# Patient Record
Sex: FEMALE | Race: WHITE | NOT HISPANIC OR LATINO | Employment: UNEMPLOYED | URBAN - METROPOLITAN AREA
[De-identification: names, ages, dates, MRNs, and addresses within clinical notes are randomized per-mention and may not be internally consistent; named-entity substitution may affect disease eponyms.]

---

## 2023-11-07 RX ORDER — PRENATAL VIT 49/IRON FUM/FOLIC 6.75-0.2MG
TABLET ORAL
COMMUNITY

## 2023-11-07 RX ORDER — NORETHINDRONE ACETATE AND ETHINYL ESTRADIOL 1; 20 MG/1; UG/1
TABLET ORAL
COMMUNITY
Start: 2023-03-31

## 2024-08-21 ENCOUNTER — INITIAL PRENATAL (OUTPATIENT)
Dept: OBSTETRICS AND GYNECOLOGY | Facility: CLINIC | Age: 19
End: 2024-08-21
Payer: COMMERCIAL

## 2024-08-21 VITALS — BODY MASS INDEX: 22.44 KG/M2 | WEIGHT: 139 LBS

## 2024-08-21 DIAGNOSIS — Z34.80 SUPERVISION OF OTHER NORMAL PREGNANCY, ANTEPARTUM (HHS-HCC): Primary | ICD-10-CM

## 2024-08-21 DIAGNOSIS — Z3A.13 13 WEEKS GESTATION OF PREGNANCY (HHS-HCC): ICD-10-CM

## 2024-08-21 DIAGNOSIS — Z87.59 HISTORY OF GESTATIONAL HYPERTENSION: ICD-10-CM

## 2024-08-21 PROCEDURE — 86704 HEP B CORE ANTIBODY TOTAL: CPT

## 2024-08-21 PROCEDURE — 86317 IMMUNOASSAY INFECTIOUS AGENT: CPT

## 2024-08-21 PROCEDURE — 83036 HEMOGLOBIN GLYCOSYLATED A1C: CPT

## 2024-08-21 PROCEDURE — 83021 HEMOGLOBIN CHROMOTOGRAPHY: CPT

## 2024-08-21 PROCEDURE — 87491 CHLMYD TRACH DNA AMP PROBE: CPT

## 2024-08-21 PROCEDURE — 86803 HEPATITIS C AB TEST: CPT

## 2024-08-21 PROCEDURE — 86706 HEP B SURFACE ANTIBODY: CPT

## 2024-08-21 PROCEDURE — 87591 N.GONORRHOEAE DNA AMP PROB: CPT

## 2024-08-21 PROCEDURE — 86780 TREPONEMA PALLIDUM: CPT

## 2024-08-21 PROCEDURE — 87389 HIV-1 AG W/HIV-1&-2 AB AG IA: CPT

## 2024-08-21 RX ORDER — ONDANSETRON 4 MG/1
TABLET, FILM COATED ORAL
COMMUNITY
Start: 2024-06-20

## 2024-08-21 RX ORDER — CYCLOBENZAPRINE HCL 10 MG
TABLET ORAL
COMMUNITY
Start: 2024-07-25

## 2024-08-21 NOTE — PROGRESS NOTES
Problem List Items Addressed This Visit       Supervision of other normal pregnancy, antepartum ([facility]) - Primary    Relevant Orders    C. Trachomatis / N. Gonorrhoeae, Amplified Detection    Hemoglobin A1C    Hemoglobin Identification with Path Review    Hepatitis B Core Antibody, Total    Hepatitis B Surface Antibody    Hepatitis C Antibody    HIV 1/2 Antigen/Antibody Screen with Reflex to Confirmation    Rubella IgG    Syphilis Screen with Reflex    13 weeks gestation of pregnancy ([facility])    Overview     Desired provider in labor: [] CNM  [x] Physician  [] Blood Products: [x] Yes, accepts [] No, needs counseling  [x] Initial BMI: 22.45   [] Prenatal Labs:   [x] Cervical Cancer Screening up to date  [x] Rh status: AB+  [x] Genetic Screening:    [x] NT US: (11-13 wks)  [x] Baby ASA (if indicated):  [] Pregnancy dated by:     [] Anatomy US: (19-20 wks)  [] Federal Sterilization consent signed (if indicated):  [] 1hr GCT at 24-28wks:  [] Rhogam (if indicated):   [] Fetal Surveillance (if indicated):  [] Tdap (27-32 wks, may be given up to 36 wks if initial window missed):   [] RSV (32-36 wks) (Sept. to end of Jan):   [] Flu Vaccine:    [] Breastfeeding:  [] Postpartum Birth control method:   [] GBS at 36 - 37 wks:  [] 39 weeks discussion of IOL vs. Expectant management:  [] Mode of delivery ( anticipated ):           Relevant Orders    C. Trachomatis / N. Gonorrhoeae, Amplified Detection    Hemoglobin A1C    Hemoglobin Identification with Path Review    Hepatitis B Core Antibody, Total    Hepatitis B Surface Antibody    Hepatitis C Antibody    HIV 1/2 Antigen/Antibody Screen with Reflex to Confirmation    Rubella IgG    Syphilis Screen with Reflex    History of gestational hypertension

## 2024-08-22 ENCOUNTER — TELEPHONE (OUTPATIENT)
Dept: OBSTETRICS AND GYNECOLOGY | Facility: CLINIC | Age: 19
End: 2024-08-22
Payer: COMMERCIAL

## 2024-08-22 DIAGNOSIS — R76.8 HEPATITIS B ANTIBODY POSITIVE: Primary | ICD-10-CM

## 2024-08-22 PROBLEM — O98.419: Status: ACTIVE | Noted: 2024-08-22

## 2024-08-22 PROBLEM — B18.1: Status: ACTIVE | Noted: 2024-08-22

## 2024-08-22 LAB
C TRACH RRNA SPEC QL NAA+PROBE: NEGATIVE
EST. AVERAGE GLUCOSE BLD GHB EST-MCNC: 80 MG/DL
HBA1C MFR BLD: 4.4 %
HBV CORE AB SER QL: NONREACTIVE
HBV SURFACE AB SER-ACNC: 108.1 MIU/ML
HCV AB SER QL: NONREACTIVE
HEMOGLOBIN A2: 3.3 % (ref 2–3.5)
HEMOGLOBIN A: 96.3 % (ref 95.8–98)
HEMOGLOBIN F: 0.4 % (ref 0–2)
HEMOGLOBIN IDENTIFICATION INTERPRETATION: NORMAL
HIV 1+2 AB+HIV1 P24 AG SERPL QL IA: NONREACTIVE
N GONORRHOEA DNA SPEC QL PROBE+SIG AMP: NEGATIVE
PATH REVIEW-HGB IDENTIFICATION: NORMAL
RUBV IGG SERPL IA-ACNC: 3.3 IA
RUBV IGG SERPL QL IA: POSITIVE
TREPONEMA PALLIDUM IGG+IGM AB [PRESENCE] IN SERUM OR PLASMA BY IMMUNOASSAY: NONREACTIVE

## 2024-08-22 NOTE — TELEPHONE ENCOUNTER
PT calling about her Rubella results. She would like a better understanding of what they mean. RICHARD

## 2024-08-23 ENCOUNTER — TELEPHONE (OUTPATIENT)
Dept: OBSTETRICS AND GYNECOLOGY | Facility: CLINIC | Age: 19
End: 2024-08-23
Payer: COMMERCIAL

## 2024-08-23 NOTE — TELEPHONE ENCOUNTER
Pt called and wanted to speak to someone about her hep B antibodies and know if she has or have had them in the past. Or if she needs to get her family tested as well. Please advise thank you

## 2024-08-28 ENCOUNTER — APPOINTMENT (OUTPATIENT)
Dept: OBSTETRICS AND GYNECOLOGY | Facility: CLINIC | Age: 19
End: 2024-08-28
Payer: COMMERCIAL

## 2024-09-17 ENCOUNTER — ROUTINE PRENATAL (OUTPATIENT)
Dept: OBSTETRICS AND GYNECOLOGY | Facility: CLINIC | Age: 19
End: 2024-09-17
Payer: COMMERCIAL

## 2024-09-17 VITALS — WEIGHT: 143 LBS | SYSTOLIC BLOOD PRESSURE: 105 MMHG | DIASTOLIC BLOOD PRESSURE: 63 MMHG | BODY MASS INDEX: 23.08 KG/M2

## 2024-09-17 DIAGNOSIS — Z34.80 SUPERVISION OF OTHER NORMAL PREGNANCY, ANTEPARTUM (HHS-HCC): Primary | ICD-10-CM

## 2024-09-17 DIAGNOSIS — Z87.59 HISTORY OF GESTATIONAL HYPERTENSION: ICD-10-CM

## 2024-09-17 DIAGNOSIS — R76.8 HEPATITIS B ANTIBODY POSITIVE: ICD-10-CM

## 2024-09-17 PROBLEM — Z3A.17 17 WEEKS GESTATION OF PREGNANCY (HHS-HCC): Status: ACTIVE | Noted: 2024-08-21

## 2024-09-17 PROCEDURE — 99213 OFFICE O/P EST LOW 20 MIN: CPT | Performed by: OBSTETRICS & GYNECOLOGY

## 2024-09-17 ASSESSMENT — ENCOUNTER SYMPTOMS
SORE THROAT: 1
WHEEZING: 0
DYSURIA: 0
DIARRHEA: 0
VOMITING: 1
HEADACHES: 1
ABDOMINAL PAIN: 0
FEVER: 1
NAUSEA: 1
COUGH: 1

## 2024-09-17 NOTE — PROGRESS NOTES
Ob Follow-up  24     SUBJECTIVE      HPI: Luda Navas is a 19 y.o.  at 17w4d here for RPNV.  She has no contractions, no bleeding, or no LOF. Reports  normal fetal movement. Patient reports nausea and vomiting last 2 days. Using zofran at home. Able to keep down liquids       OBJECTIVE  Visit Vitals  /63   Wt 64.9 kg (143 lb)   BMI 23.08 kg/m²   OB Status Pregnant   Smoking Status Some Days   BSA 1.74 m²            ASSESSMENT & PLAN    Luda Navas is a 19 y.o.  at 17w4d here for the following concerns we addressed today:    Problem List Items Addressed This Visit       Supervision of other normal pregnancy, antepartum ([facility]) - Primary    History of gestational hypertension    Hepatitis B antibody positive    Overview     Refer to gastroenterology              No orders of the defined types were placed in this encounter.       RTC in 4 weeks      Ethan Hudson DO     Answers submitted by the patient for this visit:  Fever Questionnaire (Submitted on 2024)  Chief Complaint: Fever  Chronicity: new  Onset: today  contaminated food: No  contaminated water: No  history of cancer: Yes  occupational exposure: No  recent travel: No  immunosuppression: No  recent illness: No  sick contacts: No  Frequency: rarely  Progression since onset: unchanged  Max temp prior to arrival: unmeasured  abdominal pain: No  chest pain: Yes  congestion: Yes  cough: Yes  diarrhea: No  ear pain: Yes  headaches: Yes  muscle aches: Yes  nausea: Yes  rash: No  sleepiness: Yes  sore throat: Yes  urinary pain: No  vomiting: Yes  wheezing: No

## 2024-09-18 ENCOUNTER — APPOINTMENT (OUTPATIENT)
Dept: OBSTETRICS AND GYNECOLOGY | Facility: CLINIC | Age: 19
End: 2024-09-18
Payer: COMMERCIAL

## 2024-09-18 PROBLEM — U07.1 COVID-19 AFFECTING PREGNANCY, ANTEPARTUM (HHS-HCC): Status: ACTIVE | Noted: 2024-09-18

## 2024-09-18 PROBLEM — O98.519 COVID-19 AFFECTING PREGNANCY, ANTEPARTUM (HHS-HCC): Status: ACTIVE | Noted: 2024-09-18

## 2024-10-01 ENCOUNTER — HOSPITAL ENCOUNTER (OUTPATIENT)
Dept: RADIOLOGY | Facility: CLINIC | Age: 19
Discharge: HOME | End: 2024-10-01
Payer: COMMERCIAL

## 2024-10-01 DIAGNOSIS — R76.8 HEPATITIS B ANTIBODY POSITIVE: ICD-10-CM

## 2024-10-01 DIAGNOSIS — O35.9XX0 MATERNAL CARE FOR (SUSPECTED) FETAL ABNORMALITY AND DAMAGE, UNSPECIFIED, NOT APPLICABLE OR UNSPECIFIED: ICD-10-CM

## 2024-10-01 DIAGNOSIS — Z34.80 SUPERVISION OF OTHER NORMAL PREGNANCY, ANTEPARTUM (HHS-HCC): ICD-10-CM

## 2024-10-01 DIAGNOSIS — Z87.59 HISTORY OF GESTATIONAL HYPERTENSION: ICD-10-CM

## 2024-10-01 PROCEDURE — 76805 OB US >/= 14 WKS SNGL FETUS: CPT

## 2024-10-01 PROCEDURE — 76805 OB US >/= 14 WKS SNGL FETUS: CPT | Performed by: MEDICAL GENETICS

## 2024-10-16 ENCOUNTER — APPOINTMENT (OUTPATIENT)
Dept: OBSTETRICS AND GYNECOLOGY | Facility: CLINIC | Age: 19
End: 2024-10-16
Payer: COMMERCIAL

## 2024-10-16 ENCOUNTER — ROUTINE PRENATAL (OUTPATIENT)
Dept: OBSTETRICS AND GYNECOLOGY | Facility: CLINIC | Age: 19
End: 2024-10-16
Payer: COMMERCIAL

## 2024-10-16 VITALS — DIASTOLIC BLOOD PRESSURE: 60 MMHG | SYSTOLIC BLOOD PRESSURE: 110 MMHG | WEIGHT: 151 LBS | BODY MASS INDEX: 24.37 KG/M2

## 2024-10-16 DIAGNOSIS — Z3A.21 21 WEEKS GESTATION OF PREGNANCY (HHS-HCC): ICD-10-CM

## 2024-10-16 DIAGNOSIS — Z34.80 SUPERVISION OF OTHER NORMAL PREGNANCY, ANTEPARTUM (HHS-HCC): Primary | ICD-10-CM

## 2024-10-16 PROCEDURE — 99213 OFFICE O/P EST LOW 20 MIN: CPT | Performed by: OBSTETRICS & GYNECOLOGY

## 2024-10-16 NOTE — PROGRESS NOTES
Ob Visit  10/16/24     SUBJECTIVE      HPI: Luda Navas is a 19 y.o.  at 21w5d here for RPNV.  She has no contractions, no bleeding, or no LOF. Reports normal fetal movement. Patient reports no c/o       OBJECTIVE  Visit Vitals  /60   Wt 68.5 kg (151 lb)   BMI 24.37 kg/m²   OB Status Pregnant   Smoking Status Some Days   BSA 1.79 m²            ASSESSMENT & PLAN    Luda Navas is a 19 y.o.  at 21w5d here for the following concerns we addressed today:    Problem List Items Addressed This Visit       Supervision of other normal pregnancy, antepartum ([facility]) - Primary    21 weeks gestation of pregnancy ([facility])    Overview     Desired provider in labor: [] CNM  [x] Physician  [] Blood Products: [x] Yes, accepts [] No, needs counseling  [x] Initial BMI: 22.45   [] Prenatal Labs:   [x] Cervical Cancer Screening up to date  [x] Rh status: AB+  [x] Genetic Screening:    [x] NT US: (11-13 wks)  [x] Baby ASA (if indicated):  [x] Pregnancy dated by: ultrasound    [x] Anatomy US: (19-20 wks)  [] Federal Sterilization consent signed (if indicated):  [] 1hr GCT at 24-28wks:  [] Rhogam (if indicated):   [] Fetal Surveillance (if indicated):  [] Tdap (27-32 wks, may be given up to 36 wks if initial window missed):   [] RSV (32-36 wks) (Sept. to end ):   [] Flu Vaccine:    [] Breastfeeding:  [] Postpartum Birth control method:   [] GBS at 36 - 37 wks:  [] 39 weeks discussion of IOL vs. Expectant management:  [] Mode of delivery ( anticipated ):                RTC in 4 weeks      Ethan Hudson DO

## 2024-10-23 ENCOUNTER — APPOINTMENT (OUTPATIENT)
Dept: OBSTETRICS AND GYNECOLOGY | Facility: CLINIC | Age: 19
End: 2024-10-23
Payer: COMMERCIAL

## 2024-11-13 ENCOUNTER — APPOINTMENT (OUTPATIENT)
Dept: OBSTETRICS AND GYNECOLOGY | Facility: CLINIC | Age: 19
End: 2024-11-13
Payer: COMMERCIAL

## 2024-11-13 VITALS — WEIGHT: 161 LBS | SYSTOLIC BLOOD PRESSURE: 124 MMHG | DIASTOLIC BLOOD PRESSURE: 60 MMHG | BODY MASS INDEX: 25.99 KG/M2

## 2024-11-13 DIAGNOSIS — O98.519 COVID-19 AFFECTING PREGNANCY, ANTEPARTUM (HHS-HCC): ICD-10-CM

## 2024-11-13 DIAGNOSIS — U07.1 COVID-19 AFFECTING PREGNANCY, ANTEPARTUM (HHS-HCC): ICD-10-CM

## 2024-11-13 DIAGNOSIS — R76.8 HEPATITIS B ANTIBODY POSITIVE: ICD-10-CM

## 2024-11-13 DIAGNOSIS — Z34.80 SUPERVISION OF OTHER NORMAL PREGNANCY, ANTEPARTUM (HHS-HCC): Primary | ICD-10-CM

## 2024-11-13 DIAGNOSIS — Z87.59 HISTORY OF GESTATIONAL HYPERTENSION: ICD-10-CM

## 2024-11-13 DIAGNOSIS — Z3A.25 25 WEEKS GESTATION OF PREGNANCY (HHS-HCC): ICD-10-CM

## 2024-11-13 NOTE — PROGRESS NOTES
Ob Visit  24     SUBJECTIVE      HPI: Luda Navas is a 19 y.o.  at 25w5d here for RPNV.  She has no contractions, no bleeding, or no LOF. Reports normal fetal movement. Patient reports no c/o       OBJECTIVE  Visit Vitals  /60   Wt 73 kg (161 lb)   BMI 25.99 kg/m²   OB Status Pregnant   Smoking Status Some Days   BSA 1.84 m²            ASSESSMENT & PLAN    Luda Navas is a 19 y.o.  at 25w5d here for the following concerns we addressed today:    Problem List Items Addressed This Visit       Supervision of other normal pregnancy, antepartum ([facility]) - Primary    25 weeks gestation of pregnancy ([facility])    Overview     Desired provider in labor: [] CNM  [x] Physician  [] Blood Products: [x] Yes, accepts [] No, needs counseling  [x] Initial BMI: 22.45   [] Prenatal Labs:   [x] Cervical Cancer Screening up to date  [x] Rh status: AB+  [x] Genetic Screening:    [x] NT US: (11-13 wks)  [x] Baby ASA (if indicated):  [x] Pregnancy dated by: ultrasound    [x] Anatomy US: (19-20 wks)  [] Federal Sterilization consent signed (if indicated):  [] 1hr GCT at 24-28wks:  [] Rhogam (if indicated):   [] Fetal Surveillance (if indicated):  [] Tdap (27-32 wks, may be given up to 36 wks if initial window missed):   [] RSV (32-36 wks) (Sept. to end ):   [] Flu Vaccine:    [] Breastfeeding:  [] Postpartum Birth control method:   [] GBS at 36 - 37 wks:  [] 39 weeks discussion of IOL vs. Expectant management:  [] Mode of delivery ( anticipated ):           History of gestational hypertension    Hepatitis B antibody positive    Overview     Refer to gastroenterology         [facility]    Overview     Dx on   Given paxlovid  Growth ultrasound 30 and 36 weeks              RTC in 3 weeks      Ethan Hudson, DO

## 2024-12-03 ENCOUNTER — ROUTINE PRENATAL (OUTPATIENT)
Dept: OBSTETRICS AND GYNECOLOGY | Facility: CLINIC | Age: 19
End: 2024-12-03
Payer: COMMERCIAL

## 2024-12-03 ENCOUNTER — LAB (OUTPATIENT)
Dept: LAB | Facility: LAB | Age: 19
End: 2024-12-03
Payer: COMMERCIAL

## 2024-12-03 VITALS — DIASTOLIC BLOOD PRESSURE: 82 MMHG | WEIGHT: 169 LBS | BODY MASS INDEX: 27.28 KG/M2 | SYSTOLIC BLOOD PRESSURE: 120 MMHG

## 2024-12-03 DIAGNOSIS — Z87.59 HISTORY OF GESTATIONAL HYPERTENSION: ICD-10-CM

## 2024-12-03 DIAGNOSIS — Z34.80 SUPERVISION OF OTHER NORMAL PREGNANCY, ANTEPARTUM (HHS-HCC): ICD-10-CM

## 2024-12-03 DIAGNOSIS — R76.8 HEPATITIS B ANTIBODY POSITIVE: ICD-10-CM

## 2024-12-03 DIAGNOSIS — O98.519 COVID-19 AFFECTING PREGNANCY, ANTEPARTUM (HHS-HCC): ICD-10-CM

## 2024-12-03 DIAGNOSIS — Z34.80 SUPERVISION OF OTHER NORMAL PREGNANCY, ANTEPARTUM (HHS-HCC): Primary | ICD-10-CM

## 2024-12-03 DIAGNOSIS — U07.1 COVID-19 AFFECTING PREGNANCY, ANTEPARTUM (HHS-HCC): ICD-10-CM

## 2024-12-03 PROBLEM — Z3A.28 28 WEEKS GESTATION OF PREGNANCY (HHS-HCC): Status: ACTIVE | Noted: 2024-08-21

## 2024-12-03 LAB
ERYTHROCYTE [DISTWIDTH] IN BLOOD BY AUTOMATED COUNT: 12.6 % (ref 11.5–14.5)
GLUCOSE 1H P 50 G GLC PO SERPL-MCNC: 91 MG/DL
HCT VFR BLD AUTO: 33.4 % (ref 36–46)
HGB BLD-MCNC: 11.3 G/DL (ref 12–16)
MCH RBC QN AUTO: 32.2 PG (ref 26–34)
MCHC RBC AUTO-ENTMCNC: 33.8 G/DL (ref 32–36)
MCV RBC AUTO: 95 FL (ref 80–100)
NRBC BLD-RTO: 0 /100 WBCS (ref 0–0)
PLATELET # BLD AUTO: 289 X10*3/UL (ref 150–450)
RBC # BLD AUTO: 3.51 X10*6/UL (ref 4–5.2)
REFLEX ADDED, ANEMIA PANEL: NORMAL
TREPONEMA PALLIDUM IGG+IGM AB [PRESENCE] IN SERUM OR PLASMA BY IMMUNOASSAY: NONREACTIVE
WBC # BLD AUTO: 15.7 X10*3/UL (ref 4.4–11.3)

## 2024-12-03 PROCEDURE — 85027 COMPLETE CBC AUTOMATED: CPT

## 2024-12-03 PROCEDURE — 82947 ASSAY GLUCOSE BLOOD QUANT: CPT

## 2024-12-03 PROCEDURE — 86780 TREPONEMA PALLIDUM: CPT

## 2024-12-03 PROCEDURE — 36415 COLL VENOUS BLD VENIPUNCTURE: CPT

## 2024-12-03 PROCEDURE — 99213 OFFICE O/P EST LOW 20 MIN: CPT | Performed by: OBSTETRICS & GYNECOLOGY

## 2024-12-03 NOTE — PROGRESS NOTES
Ob Visit  24     SUBJECTIVE      HPI: Luda Navas is a 19 y.o.  at 28w4d here for RPNV.  She has no contractions, no bleeding, or no LOF. Reports normal fetal movement. Patient reports no c/o       OBJECTIVE  Visit Vitals  /82   Wt 76.7 kg (169 lb)   BMI 27.28 kg/m²   OB Status Pregnant   Smoking Status Some Days   BSA 1.89 m²            ASSESSMENT & PLAN    Luda Navas is a 19 y.o.  at 28w4d here for the following concerns we addressed today:    Problem List Items Addressed This Visit       Supervision of other normal pregnancy, antepartum ([facility]) - Primary    Relevant Orders    CBC Anemia Panel With Reflex, Pregnancy    Syphilis Screen with Reflex    Glucose, 1 Hour Screen, Pregnancy    History of gestational hypertension    Hepatitis B antibody positive    Overview     Refer to gastroenterology         COVID-19 affecting pregnancy, antepartum ([facility])    Overview     Dx on   Given paxlovid  Growth ultrasound 30 and 36 weeks              RTC in 2 weeks      Ethan Hudson, DO

## 2024-12-04 ENCOUNTER — APPOINTMENT (OUTPATIENT)
Dept: OBSTETRICS AND GYNECOLOGY | Facility: CLINIC | Age: 19
End: 2024-12-04
Payer: COMMERCIAL

## 2024-12-05 ENCOUNTER — TELEPHONE (OUTPATIENT)
Dept: OBSTETRICS AND GYNECOLOGY | Facility: CLINIC | Age: 19
End: 2024-12-05
Payer: COMMERCIAL

## 2024-12-05 NOTE — TELEPHONE ENCOUNTER
Pt would like results of her blood work she had done. Please advise thank you.  Evelyn Hernandez MA

## 2024-12-06 ENCOUNTER — TELEPHONE (OUTPATIENT)
Dept: OBSTETRICS AND GYNECOLOGY | Facility: CLINIC | Age: 19
End: 2024-12-06
Payer: COMMERCIAL

## 2024-12-18 ENCOUNTER — APPOINTMENT (OUTPATIENT)
Dept: OBSTETRICS AND GYNECOLOGY | Facility: CLINIC | Age: 19
End: 2024-12-18
Payer: COMMERCIAL

## 2024-12-18 VITALS — SYSTOLIC BLOOD PRESSURE: 116 MMHG | DIASTOLIC BLOOD PRESSURE: 62 MMHG | BODY MASS INDEX: 27.54 KG/M2 | WEIGHT: 170.6 LBS

## 2024-12-18 DIAGNOSIS — U07.1 COVID-19 AFFECTING PREGNANCY, ANTEPARTUM (HHS-HCC): ICD-10-CM

## 2024-12-18 DIAGNOSIS — Z3A.30 30 WEEKS GESTATION OF PREGNANCY (HHS-HCC): ICD-10-CM

## 2024-12-18 DIAGNOSIS — Z34.80 SUPERVISION OF OTHER NORMAL PREGNANCY, ANTEPARTUM (HHS-HCC): Primary | ICD-10-CM

## 2024-12-18 DIAGNOSIS — Z87.59 HISTORY OF GESTATIONAL HYPERTENSION: ICD-10-CM

## 2024-12-18 DIAGNOSIS — R76.8 HEPATITIS B ANTIBODY POSITIVE: ICD-10-CM

## 2024-12-18 DIAGNOSIS — O98.519 COVID-19 AFFECTING PREGNANCY, ANTEPARTUM (HHS-HCC): ICD-10-CM

## 2024-12-18 PROCEDURE — 99213 OFFICE O/P EST LOW 20 MIN: CPT | Performed by: OBSTETRICS & GYNECOLOGY

## 2024-12-18 NOTE — PROGRESS NOTES
Ob Visit  24     SUBJECTIVE      HPI: Luda Navas is a 19 y.o.  at 30w5d here for RPNV.  She has no contractions, no bleeding, or  LOF. Reports normal fetal movement. Patient reports no c/o       OBJECTIVE  Visit Vitals  /62   Wt 77.4 kg (170 lb 9.6 oz)   BMI 27.54 kg/m²   OB Status Pregnant   Smoking Status Some Days   BSA 1.9 m²            ASSESSMENT & PLAN    Luda Navas is a 19 y.o.  at 30w5d here for the following concerns we addressed today:    Problem List Items Addressed This Visit       Supervision of other normal pregnancy, antepartum ([facility]) - Primary    30 weeks gestation of pregnancy ([facility])    Overview     Desired provider in labor: [] CNM  [x] Physician  [] Blood Products: [x] Yes, accepts [] No, needs counseling  [x] Initial BMI: 22.45   [] Prenatal Labs:   [x] Cervical Cancer Screening up to date  [x] Rh status: AB+  [x] Genetic Screening:    [x] NT US: (11-13 wks)  [x] Baby ASA (if indicated):  [x] Pregnancy dated by: ultrasound    [x] Anatomy US: (19-20 wks)  [] Federal Sterilization consent signed (if indicated):  [x] 1hr GCT at 24-28wks:  [x] Rhogam (if indicated): n/a  [] Fetal Surveillance (if indicated):  [] Tdap (27-32 wks, may be given up to 36 wks if initial window missed):   [] RSV (32-36 wks) (Sept. to end of ):   [] Flu Vaccine:    [] Breastfeeding:  [] Postpartum Birth control method:   [] GBS at 36 - 37 wks:  [] 39 weeks discussion of IOL vs. Expectant management:  [] Mode of delivery ( anticipated ):           History of gestational hypertension    Hepatitis B antibody positive    Overview     Refer to gastroenterology         COVID-19 affecting pregnancy, antepartum ([facility])    Overview     Dx on   Given paxlovid  Growth ultrasound 30 and 36 weeks              RTC in 2 weeks      Ethan Hudson DO

## 2024-12-19 ENCOUNTER — TELEPHONE (OUTPATIENT)
Dept: OBSTETRICS AND GYNECOLOGY | Facility: CLINIC | Age: 19
End: 2024-12-19
Payer: COMMERCIAL

## 2024-12-19 DIAGNOSIS — O21.9 NAUSEA AND VOMITING DURING PREGNANCY: Primary | ICD-10-CM

## 2024-12-19 RX ORDER — ONDANSETRON 4 MG/1
8 TABLET, FILM COATED ORAL EVERY 12 HOURS PRN
Qty: 20 TABLET | Refills: 0 | Status: SHIPPED | OUTPATIENT
Start: 2024-12-19

## 2024-12-19 NOTE — TELEPHONE ENCOUNTER
Pt calling to request RX for back pain and  nausea - please send into [facility] on file    Yvonne Gonzalez

## 2025-01-02 ENCOUNTER — APPOINTMENT (OUTPATIENT)
Dept: OBSTETRICS AND GYNECOLOGY | Facility: CLINIC | Age: 20
End: 2025-01-02
Payer: COMMERCIAL

## 2025-01-02 ENCOUNTER — APPOINTMENT (OUTPATIENT)
Dept: GASTROENTEROLOGY | Facility: CLINIC | Age: 20
End: 2025-01-02
Payer: COMMERCIAL

## 2025-01-02 VITALS — WEIGHT: 174 LBS | SYSTOLIC BLOOD PRESSURE: 125 MMHG | BODY MASS INDEX: 28.08 KG/M2 | DIASTOLIC BLOOD PRESSURE: 74 MMHG

## 2025-01-02 DIAGNOSIS — K08.89 TOOTH PAIN: ICD-10-CM

## 2025-01-02 DIAGNOSIS — O98.519 COVID-19 AFFECTING PREGNANCY, ANTEPARTUM (HHS-HCC): ICD-10-CM

## 2025-01-02 DIAGNOSIS — Z34.80 SUPERVISION OF OTHER NORMAL PREGNANCY, ANTEPARTUM (HHS-HCC): Primary | ICD-10-CM

## 2025-01-02 DIAGNOSIS — Z3A.32 32 WEEKS GESTATION OF PREGNANCY (HHS-HCC): ICD-10-CM

## 2025-01-02 DIAGNOSIS — U07.1 COVID-19 AFFECTING PREGNANCY, ANTEPARTUM (HHS-HCC): ICD-10-CM

## 2025-01-02 DIAGNOSIS — Z87.59 HISTORY OF GESTATIONAL HYPERTENSION: ICD-10-CM

## 2025-01-02 PROCEDURE — 99213 OFFICE O/P EST LOW 20 MIN: CPT | Performed by: OBSTETRICS & GYNECOLOGY

## 2025-01-02 RX ORDER — AMOXICILLIN 500 MG/1
500 TABLET, FILM COATED ORAL 2 TIMES DAILY
Qty: 20 TABLET | Refills: 0 | Status: SHIPPED | OUTPATIENT
Start: 2025-01-02 | End: 2025-01-12

## 2025-01-02 NOTE — PROGRESS NOTES
Ob Visit  25     SUBJECTIVE      HPI: Luda Navas is a 19 y.o.  at 32w6d here for RPNV.  She has no contractions, no bleeding, or no LOF. Reports normal fetal movement. Patient reports no c/o       OBJECTIVE  Visit Vitals  /74   Wt 78.9 kg (174 lb)   BMI 28.08 kg/m²   OB Status Pregnant   Smoking Status Some Days   BSA 1.92 m²            ASSESSMENT & PLAN    Luda Navas is a 19 y.o.  at 32w6d here for the following concerns we addressed today:    Problem List Items Addressed This Visit       Supervision of other normal pregnancy, antepartum ([facility]) - Primary    32 weeks gestation of pregnancy ([facility])    Overview     Desired provider in labor: [] CNM  [x] Physician  [] Blood Products: [x] Yes, accepts [] No, needs counseling  [x] Initial BMI: 22.45   [x] Prenatal Labs:   [x] Cervical Cancer Screening up to date  [x] Rh status: AB+  [x] Genetic Screening:    [x] NT US: (11-13 wks)  [x] Baby ASA (if indicated):  [x] Pregnancy dated by: ultrasound    [x] Anatomy US: (19-20 wks)  [] Federal Sterilization consent signed (if indicated):  [x] 1hr GCT at 24-28wks:  [x] Rhogam (if indicated): n/a  [] Fetal Surveillance (if indicated):  [x] Tdap (27-32 wks, may be given up to 36 wks if initial window missed): 1/2  [] RSV (32-36 wks) (Sept. to end of ):   [x] Flu Vaccine:1/    [] Breastfeeding:  [] Postpartum Birth control method:   [] GBS at 36 - 37 wks:  [] 39 weeks discussion of IOL vs. Expectant management:  [] Mode of delivery ( anticipated ):           History of gestational hypertension    COVID-19 affecting pregnancy, antepartum ([facility])    Overview     Dx on   Given paxlovid  Growth ultrasound 30 and 36 weeks         Relevant Orders    US MAC OB imaging order         RTC in 2 weeks      Ethan Hudson,

## 2025-01-07 ENCOUNTER — HOSPITAL ENCOUNTER (OUTPATIENT)
Dept: RADIOLOGY | Facility: CLINIC | Age: 20
Discharge: HOME | End: 2025-01-07
Payer: COMMERCIAL

## 2025-01-07 DIAGNOSIS — Z87.59 HISTORY OF GESTATIONAL HYPERTENSION: ICD-10-CM

## 2025-01-07 DIAGNOSIS — Z34.80 SUPERVISION OF OTHER NORMAL PREGNANCY, ANTEPARTUM (HHS-HCC): ICD-10-CM

## 2025-01-07 DIAGNOSIS — R76.8 HEPATITIS B ANTIBODY POSITIVE: ICD-10-CM

## 2025-01-07 DIAGNOSIS — O36.5930 MATERNAL CARE FOR OTHER KNOWN OR SUSPECTED POOR FETAL GROWTH, THIRD TRIMESTER, NOT APPLICABLE OR UNSPECIFIED: ICD-10-CM

## 2025-01-07 PROCEDURE — 76816 OB US FOLLOW-UP PER FETUS: CPT

## 2025-01-07 PROCEDURE — 76816 OB US FOLLOW-UP PER FETUS: CPT | Performed by: OBSTETRICS & GYNECOLOGY

## 2025-01-08 ENCOUNTER — TELEPHONE (OUTPATIENT)
Dept: OBSTETRICS AND GYNECOLOGY | Facility: CLINIC | Age: 20
End: 2025-01-08
Payer: COMMERCIAL

## 2025-01-08 NOTE — TELEPHONE ENCOUNTER
Pt called and would like to go over ultrasound results. Please advise thank you.  Evelyn Hernandez MA

## 2025-01-12 ENCOUNTER — HOSPITAL ENCOUNTER (OUTPATIENT)
Facility: HOSPITAL | Age: 20
Discharge: HOME | End: 2025-01-12
Attending: OBSTETRICS & GYNECOLOGY | Admitting: OBSTETRICS & GYNECOLOGY
Payer: COMMERCIAL

## 2025-01-12 ENCOUNTER — HOSPITAL ENCOUNTER (OUTPATIENT)
Facility: HOSPITAL | Age: 20
End: 2025-01-12
Attending: OBSTETRICS & GYNECOLOGY | Admitting: OBSTETRICS & GYNECOLOGY
Payer: COMMERCIAL

## 2025-01-12 VITALS
TEMPERATURE: 98.2 F | BODY MASS INDEX: 27.95 KG/M2 | SYSTOLIC BLOOD PRESSURE: 106 MMHG | OXYGEN SATURATION: 98 % | RESPIRATION RATE: 16 BRPM | WEIGHT: 173.17 LBS | DIASTOLIC BLOOD PRESSURE: 54 MMHG | HEART RATE: 86 BPM

## 2025-01-12 DIAGNOSIS — Z36.89 NST (NON-STRESS TEST) REACTIVE (HHS-HCC): ICD-10-CM

## 2025-01-12 DIAGNOSIS — O99.891: ICD-10-CM

## 2025-01-12 DIAGNOSIS — Z34.83 MULTIGRAVIDA IN THIRD TRIMESTER (HHS-HCC): ICD-10-CM

## 2025-01-12 DIAGNOSIS — N30.00 ACUTE CYSTITIS WITHOUT HEMATURIA: ICD-10-CM

## 2025-01-12 DIAGNOSIS — O23.599 BACTERIAL VAGINOSIS IN PREGNANCY (HHS-HCC): Primary | ICD-10-CM

## 2025-01-12 DIAGNOSIS — R82.71: ICD-10-CM

## 2025-01-12 DIAGNOSIS — Z3A.34 34 WEEKS GESTATION OF PREGNANCY (HHS-HCC): ICD-10-CM

## 2025-01-12 DIAGNOSIS — B96.89 BACTERIAL VAGINOSIS IN PREGNANCY (HHS-HCC): Primary | ICD-10-CM

## 2025-01-12 LAB
APPEARANCE UR: CLEAR
BACTERIA #/AREA URNS AUTO: ABNORMAL /HPF
BILIRUB UR STRIP.AUTO-MCNC: NEGATIVE MG/DL
COLOR UR: ABNORMAL
GLUCOSE UR STRIP.AUTO-MCNC: ABNORMAL MG/DL
KETONES UR STRIP.AUTO-MCNC: ABNORMAL MG/DL
LEUKOCYTE ESTERASE UR QL STRIP.AUTO: ABNORMAL
MUCOUS THREADS #/AREA URNS AUTO: ABNORMAL /LPF
NITRITE UR QL STRIP.AUTO: NEGATIVE
PH UR STRIP.AUTO: 6 [PH]
PROT UR STRIP.AUTO-MCNC: ABNORMAL MG/DL
RBC # UR STRIP.AUTO: NEGATIVE /UL
RBC #/AREA URNS AUTO: ABNORMAL /HPF
SP GR UR STRIP.AUTO: 1.03
SQUAMOUS #/AREA URNS AUTO: ABNORMAL /HPF
UROBILINOGEN UR STRIP.AUTO-MCNC: NORMAL MG/DL
WBC #/AREA URNS AUTO: ABNORMAL /HPF

## 2025-01-12 PROCEDURE — 81001 URINALYSIS AUTO W/SCOPE: CPT | Performed by: MIDWIFE

## 2025-01-12 PROCEDURE — 99213 OFFICE O/P EST LOW 20 MIN: CPT | Performed by: MIDWIFE

## 2025-01-12 PROCEDURE — 59025 FETAL NON-STRESS TEST: CPT | Performed by: MIDWIFE

## 2025-01-12 PROCEDURE — 87086 URINE CULTURE/COLONY COUNT: CPT | Mod: AHULAB | Performed by: MIDWIFE

## 2025-01-12 PROCEDURE — 2500000002 HC RX 250 W HCPCS SELF ADMINISTERED DRUGS (ALT 637 FOR MEDICARE OP, ALT 636 FOR OP/ED): Performed by: MIDWIFE

## 2025-01-12 PROCEDURE — 99212 OFFICE O/P EST SF 10 MIN: CPT

## 2025-01-12 RX ORDER — NITROFURANTOIN 25; 75 MG/1; MG/1
100 CAPSULE ORAL EVERY 12 HOURS SCHEDULED
Status: DISCONTINUED | OUTPATIENT
Start: 2025-01-12 | End: 2025-01-12 | Stop reason: HOSPADM

## 2025-01-12 RX ORDER — METRONIDAZOLE 500 MG/1
500 TABLET ORAL 2 TIMES DAILY
Qty: 14 TABLET | Refills: 0 | Status: SHIPPED | OUTPATIENT
Start: 2025-01-12 | End: 2025-01-19

## 2025-01-12 RX ORDER — NITROFURANTOIN 25; 75 MG/1; MG/1
100 CAPSULE ORAL 2 TIMES DAILY
Qty: 9 CAPSULE | Refills: 0 | Status: SHIPPED | OUTPATIENT
Start: 2025-01-12 | End: 2025-01-17

## 2025-01-12 RX ADMIN — NITROFURANTOIN MONOHYDRATE/MACROCRYSTALS 100 MG: 25; 75 CAPSULE ORAL at 21:35

## 2025-01-12 SDOH — SOCIAL STABILITY: SOCIAL INSECURITY: PHYSICAL ABUSE: DENIES

## 2025-01-12 SDOH — SOCIAL STABILITY: SOCIAL INSECURITY: HAS ANYONE EVER THREATENED TO HURT YOUR FAMILY OR YOUR PETS?: NO

## 2025-01-12 SDOH — SOCIAL STABILITY: SOCIAL INSECURITY: HAVE YOU HAD THOUGHTS OF HARMING ANYONE ELSE?: NO

## 2025-01-12 SDOH — HEALTH STABILITY: MENTAL HEALTH: NON-SPECIFIC ACTIVE SUICIDAL THOUGHTS (PAST 1 MONTH): NO

## 2025-01-12 SDOH — SOCIAL STABILITY: SOCIAL INSECURITY: ARE YOU OR HAVE YOU BEEN THREATENED OR ABUSED PHYSICALLY, EMOTIONALLY, OR SEXUALLY BY ANYONE?: NO

## 2025-01-12 SDOH — HEALTH STABILITY: MENTAL HEALTH: WERE YOU ABLE TO COMPLETE ALL THE BEHAVIORAL HEALTH SCREENINGS?: YES

## 2025-01-12 SDOH — SOCIAL STABILITY: SOCIAL INSECURITY: ABUSE SCREEN: ADULT

## 2025-01-12 SDOH — HEALTH STABILITY: MENTAL HEALTH: HAVE YOU USED ANY SUBSTANCES (CANABIS, COCAINE, HEROIN, HALLUCINOGENS, INHALANTS, ETC.) IN THE PAST 12 MONTHS?: NO

## 2025-01-12 SDOH — SOCIAL STABILITY: SOCIAL INSECURITY: DOES ANYONE TRY TO KEEP YOU FROM HAVING/CONTACTING OTHER FRIENDS OR DOING THINGS OUTSIDE YOUR HOME?: NO

## 2025-01-12 SDOH — HEALTH STABILITY: MENTAL HEALTH: SUICIDAL BEHAVIOR (LIFETIME): NO

## 2025-01-12 SDOH — SOCIAL STABILITY: SOCIAL INSECURITY: DO YOU FEEL ANYONE HAS EXPLOITED OR TAKEN ADVANTAGE OF YOU FINANCIALLY OR OF YOUR PERSONAL PROPERTY?: NO

## 2025-01-12 SDOH — ECONOMIC STABILITY: HOUSING INSECURITY: DO YOU FEEL UNSAFE GOING BACK TO THE PLACE WHERE YOU ARE LIVING?: NO

## 2025-01-12 SDOH — HEALTH STABILITY: MENTAL HEALTH: HAVE YOU USED ANY PRESCRIPTION DRUGS OTHER THAN PRESCRIBED IN THE PAST 12 MONTHS?: NO

## 2025-01-12 SDOH — SOCIAL STABILITY: SOCIAL INSECURITY: VERBAL ABUSE: DENIES

## 2025-01-12 SDOH — SOCIAL STABILITY: SOCIAL INSECURITY: HAVE YOU HAD ANY THOUGHTS OF HARMING ANYONE ELSE?: NO

## 2025-01-12 SDOH — HEALTH STABILITY: MENTAL HEALTH: WISH TO BE DEAD (PAST 1 MONTH): NO

## 2025-01-12 ASSESSMENT — LIFESTYLE VARIABLES
AUDIT-C TOTAL SCORE: 0
AUDIT-C TOTAL SCORE: 0
SKIP TO QUESTIONS 9-10: 1
HOW OFTEN DO YOU HAVE 6 OR MORE DRINKS ON ONE OCCASION: NEVER
HOW MANY STANDARD DRINKS CONTAINING ALCOHOL DO YOU HAVE ON A TYPICAL DAY: PATIENT DOES NOT DRINK
HOW OFTEN DO YOU HAVE A DRINK CONTAINING ALCOHOL: NEVER

## 2025-01-12 ASSESSMENT — PATIENT HEALTH QUESTIONNAIRE - PHQ9
1. LITTLE INTEREST OR PLEASURE IN DOING THINGS: NOT AT ALL
2. FEELING DOWN, DEPRESSED OR HOPELESS: NOT AT ALL
SUM OF ALL RESPONSES TO PHQ9 QUESTIONS 1 & 2: 0

## 2025-01-13 ENCOUNTER — TELEPHONE (OUTPATIENT)
Dept: OBSTETRICS AND GYNECOLOGY | Facility: HOSPITAL | Age: 20
End: 2025-01-13
Payer: COMMERCIAL

## 2025-01-13 NOTE — DISCHARGE SUMMARY
Discharge Summary    Admission Date: 1/12/2025  Discharge Date: 1/12/2025    Discharge Diagnosis  -BV affecting pregnancy   -bacteriuria affecting pregnancy   -multigravida with 34 completed weeks gestation  -Reactive NST    Hospital Course  UA resulted with ketones and bacteria. We reviewed these results, reflex C&S that won't result for 48H, benefits of starting ATB, and follow up for results with next appointment on Wednesday. She is agreeable to starting macrobid and will receive first dose prior to discharge. We also reviewed BV in pregnancy and she is agreeable to metronidazole. She will increase fluids. Cervical exam unchanged, she is agreeable to discharge to home. She will return to triage or follow up in office with any new/worsening/unrelieved concerns. Pt discharged in stable condition.       Procedures: none      Pertinent Physical Exam At Time of Discharge  Exam including repeat cervical exam unchanged.     Last Vitals:  Temp Pulse Resp BP MAP Pulse Ox   36.8 °C (98.2 °F) 86 16 106/54 74 98 %     Discharge Meds     Your medication list        START taking these medications        Instructions Last Dose Given Next Dose Due   metroNIDAZOLE 500 mg tablet  Commonly known as: Flagyl      Take 1 tablet (500 mg) by mouth 2 times a day for 7 days.       nitrofurantoin (macrocrystal-monohydrate) 100 mg capsule  Commonly known as: Macrobid      Take 1 capsule (100 mg) by mouth 2 times a day for 5 days.              CONTINUE taking these medications        Instructions Last Dose Given Next Dose Due   amoxicillin 500 mg tablet  Commonly known as: Amoxil      Take 1 tablet (500 mg) by mouth 2 times a day for 10 days.       cyclobenzaprine 10 mg tablet  Commonly known as: Flexeril           Mini Prenatal 6.75 mg iron- 200 mcg tablet  Generic drug: prenatal vit 49-iron fum-folic           ondansetron 4 mg tablet  Commonly known as: Zofran      Take 2 tablets (8 mg) by mouth every 12 hours if needed for nausea or  vomiting.                 Where to Get Your Medications        These medications [id number] [place] [facility] [address] [facility]  [address] [address]      Hours: 24-hours Phone: [phone]   metroNIDAZOLE 500 mg tablet  nitrofurantoin (macrocrystal-monohydrate) 100 mg capsule          Complications Requiring Follow-Up  -Bacteriuria with pending C&S, will start ATB  -BV, will start metronidazole     Test Results Pending At Discharge  Pending Labs       Order Current Status    Extra Urine Gray Tube Collected (01/12/25 2030)    Urinalysis with Reflex Culture and Microscopic In process    Urine Culture In process            Outpatient Follow-Up  Future Appointments   Date Time Provider Department Center   1/15/2025 10:15 AM Ethan Hudson DO [id number]       I spent 25 minutes in the professional and overall care of this patient.      ENRIKE Sam-LONDON

## 2025-01-13 NOTE — H&P
OB Triage H&P    Assessment/Plan    Luda Navas is a 19 y.o.  at 34w2d, ROCKY: 2025, by Ultrasound, who presents to triage with abdominal cramping and LOF.    Plan    -Fetal monitoring reassuring  -Good fetal movement  -Up to date on prenatal care  -Continue routine prenatal care    Dispo  -negative x3, +clue cells noted on microscopy  -recheck cervix 1H per pt request  -anticipate likely discharge home    Discussed plan and reviewed with: pt and SO in person while pt's mother on speaker phone    DAVID CALVERT-LONDON   Pregnancy Problems (from 24 to present)       Problem Noted Diagnosed Resolved    COVID-19 affecting pregnancy, antepartum ([facility]) 2024 by Ethan Hudson, DO  No    Priority:  Medium       Overview Signed 2024  9:00 AM by Ethan Hudson, DO     Dx on   Given paxlovid  Growth ultrasound 30 and 36 weeks         Hepatitis B antibody positive 2024 by Ethan Hudson, DO  No    Priority:  Medium       Overview Signed 2024 11:57 AM by Ethan Hudson, DO     Refer to gastroenterology         Supervision of other normal pregnancy, antepartum ([facility]) 2024 by Ethan Hudson, DO  No    Priority:  Medium       32 weeks gestation of pregnancy ([facility]) 2024 by Ethan Hudson, DO  No    Priority:  Medium       Overview Addendum 2025  1:30 PM by Ethan Hudson, DO     Desired provider in labor: [] CNM  [x] Physician  [] Blood Products: [x] Yes, accepts [] No, needs counseling  [x] Initial BMI: 22.45   [x] Prenatal Labs:   [x] Cervical Cancer Screening up to date  [x] Rh status: AB+  [x] Genetic Screening:    [x] NT US: (11-13 wks)  [x] Baby ASA (if indicated):  [x] Pregnancy dated by: ultrasound    [x] Anatomy US: (19-20 wks)  [] Federal Sterilization consent signed (if indicated):  [x] 1hr GCT at 24-28wks:  [x] Rhogam (if indicated): n/a  [] Fetal Surveillance (if indicated):  [x] Tdap (27-32 wks, may be given up to 36 wks if initial window missed):   []  RSV (32-36 wks) (Sept. to end of ):   [x] Flu Vaccine:    [] Breastfeeding:  [] Postpartum Birth control method:   [] GBS at 36 - 37 wks:  [] 39 weeks discussion of IOL vs. Expectant management:  [] Mode of delivery ( anticipated ):                   Subjective   Pt presents with her partner to [facility] L&D triage c/o mild abdominal cramping throughout the day that has progressively worsened and clear, odorless vaginal fluid that started earlier this afternoon and has been recurring since. She endorses regular fetal movement and denies HA; blurred vision; chest or RUQ pain; urinary symptoms; vaginal odor, discharge, bleeding, or concerns for STD exposure; or edema. Pt states last cervical exam several weeks ago at [facility] and she was FT dilated.     Prenatal Provider Biats    Initially some vaginal secretions noted prior to exam, nitrazine applied to this and negative. Pt was agreeable to pelvic exam with speculum, this was completed and large amount of white, watery discharge was noted in posterior vault. No evidence of LOF with Valsalva maneuver, several swabs collected from fluid. First swab was again negative on nitrazine, two separate microscopy specimens collected. Microscopy on each view was negative for ferning, one positive for clue cells. CNM returned to bedside to discuss findings and treatment for BV, she has had this in the past and is agreeable to metronidazole and sending urine to r/o UTI. We discussed that cervical exam sounds unchanged from previous exam at [facility], she would feel more comfortable with cervical recheck in 1H since cramping has progressively worsened throughout the evening.     OB History    Para Term  AB Living   2 1 1 0 0 1   SAB IAB Ectopic Multiple Live Births   0 0 0 0 1      # Outcome Date GA Lbr Davon/2nd Weight Sex Type Anes PTL Lv   2 Current            1 Term 22 38w0d  2.92 kg M Vag-Spont EPI N SRAVAN       Past Surgical History:   Procedure Laterality Date     OTHER SURGICAL HISTORY  10/04/2022    Tonsillectomy with adenoidectomy       Social History     Tobacco Use    Smoking status: Some Days     Current packs/day: 0.25     Average packs/day: 0.3 packs/day for 3.0 years (0.8 ttl pk-yrs)     Types: Cigarettes    Smokeless tobacco: Never   Substance Use Topics    Alcohol use: Never       No Known Allergies    Medications Prior to Admission   Medication Sig Dispense Refill Last Dose/Taking    amoxicillin (Amoxil) 500 mg tablet Take 1 tablet (500 mg) by mouth 2 times a day for 10 days. 20 tablet 0     cyclobenzaprine (Flexeril) 10 mg tablet TAKE 1 TABLET BY MOUTH TWICE DAILY FOR UP TO 5 DAYS AS NEEDED FOR MUSCLE SPASM       ondansetron (Zofran) 4 mg tablet Take 2 tablets (8 mg) by mouth every 12 hours if needed for nausea or vomiting. 20 tablet 0     prenatal vit 49-iron fum-folic (Mini Prenatal) 6.75 mg iron- 200 mcg tablet Take by mouth.        Objective     Last Vitals  Temp Pulse Resp BP MAP O2 Sat   36.8 °C (98.2 °F) (!) 117 16 126/78 96 98 %     Blood Pressures         1/12/2025 2017             BP: 126/78             Physical Exam  General: NAD, mood appropriate  Cardiopulmonary: warm and well perfused, breathing comfortably on room air. Lungs CTAB, HRR with S1 & S2 sounds no murmur.   Abdomen: Gravid, non-tender  Extremities: Symmetric  Speculum Exam: large amount of white watery discharge collected in posterior vault. Negative nitrazine x2 attempts from different areas, negative ferning x2 attempts from different areas, no pooling from cervical os with valsalva maneuver. +clue cells on microscopy.   Cervix: Fingertip external os with closed inner os, thick & posterior      Fetal Monitoring  Baseline: 145 bpm, Variability: moderate,  Accelerations: present and Decelerations: none  Uterine Activity: No contractions seen on toco  Interpretation: Reactive    Bedside ultrasound: No    Labs in chart were reviewed.          Prenatal labs reviewed, not remarkable.

## 2025-01-13 NOTE — H&P
OB Triage H&P    Assessment/Plan    Luda Navas is a 19 y.o.  at 34w2d, ROCKY: 2025, by Ultrasound, who presents to triage with ***.    Plan  ***  -Fetal monitoring reassuring  -Good fetal movement  -Up to date on prenatal care  -Continue routine prenatal care    Dispo  -Patient appropriate for discharge home, agrees with plan  -Return precautions discussed   -Follow up at next scheduled OB appointment or to triage sooner as needed    {Discussed plan and reviewed with:[id number]}    Pregnancy Problems (from 24 to present)       Problem Noted Diagnosed Resolved    COVID-19 affecting pregnancy, antepartum ([facility]) 2024 by Ethan Hudson, DO  No    Priority:  Medium       Overview Signed 2024  9:00 AM by Ethan Hudson, DO     Dx on   Given paxlovid  Growth ultrasound 30 and 36 weeks         Hepatitis B antibody positive 2024 by Ethan Hudson, DO  No    Priority:  Medium       Overview Signed 2024 11:57 AM by Ethan Hudson, DO     Refer to gastroenterology         Supervision of other normal pregnancy, antepartum ([facility]) 2024 by Ethan Hudson, DO  No    Priority:  Medium       32 weeks gestation of pregnancy ([facility]) 2024 by Ethan Hudson, DO  No    Priority:  Medium       Overview Addendum 2025  1:30 PM by Ethan Hudson, DO     Desired provider in labor: [] CNM  [x] Physician  [] Blood Products: [x] Yes, accepts [] No, needs counseling  [x] Initial BMI: 22.45   [x] Prenatal Labs:   [x] Cervical Cancer Screening up to date  [x] Rh status: AB+  [x] Genetic Screening:    [x] NT US: (11-13 wks)  [x] Baby ASA (if indicated):  [x] Pregnancy dated by: ultrasound    [x] Anatomy US: (19-20 wks)  [] Federal Sterilization consent signed (if indicated):  [x] 1hr GCT at 24-28wks:  [x] Rhogam (if indicated): n/a  [] Fetal Surveillance (if indicated):  [x] Tdap (27-32 wks, may be given up to 36 wks if initial window missed):   [] RSV (32-36 wks) (Sept. to end of  "):   [x] Flu Vaccine:    [] Breastfeeding:  [] Postpartum Birth control method:   [] GBS at 36 - 37 wks:  [] 39 weeks discussion of IOL vs. Expectant management:  [] Mode of delivery ( anticipated ):                   Subjective   {fetal movement:34714}  {ob subjectives:50949}    Prenatal Provider ***    OB History    Para Term  AB Living   2 1 1 0 0 1   SAB IAB Ectopic Multiple Live Births   0 0 0 0 1      # Outcome Date GA Lbr Davon/2nd Weight Sex Type Anes PTL Lv   2 Current            1 Term 22 38w0d  2.92 kg M Vag-Spont EPI N SRAVAN       Past Surgical History:   Procedure Laterality Date    OTHER SURGICAL HISTORY  10/04/2022    Tonsillectomy with adenoidectomy       Social History     Tobacco Use    Smoking status: Some Days     Current packs/day: 0.25     Average packs/day: 0.3 packs/day for 3.0 years (0.8 ttl pk-yrs)     Types: Cigarettes    Smokeless tobacco: Never   Substance Use Topics    Alcohol use: Never       No Known Allergies    Medications Prior to Admission   Medication Sig Dispense Refill Last Dose/Taking    amoxicillin (Amoxil) 500 mg tablet Take 1 tablet (500 mg) by mouth 2 times a day for 10 days. 20 tablet 0     cyclobenzaprine (Flexeril) 10 mg tablet TAKE 1 TABLET BY MOUTH TWICE DAILY FOR UP TO 5 DAYS AS NEEDED FOR MUSCLE SPASM       ondansetron (Zofran) 4 mg tablet Take 2 tablets (8 mg) by mouth every 12 hours if needed for nausea or vomiting. 20 tablet 0     prenatal vit 49-iron fum-folic (Mini Prenatal) 6.75 mg iron- 200 mcg tablet Take by mouth.        Objective     Last Vitals  Temp Pulse Resp BP MAP O2 Sat                   Blood Pressures    No data found in the last 1 encounters.          Physical Exam  General: NAD, mood appropriate  Cardiopulmonary: warm and well perfused, breathing comfortably on room air  Abdomen: Gravid, non-tender  Extremities: Symmetric  Speculum Exam: {sterile speculum exam findings, possible srom:429911::\"deferred\"}  Cervix:   /  /     " "  Fetal Monitoring  Baseline: *** bpm, Variability: {obgyn fetal heart variability:127885::\"moderate\"},  {Accelerations:2784473775} and {Decelerations:3785574312}  {Uterine Activity:10929}  {Interpretation:94008}    {Bedside ultrasound:9499126384}    {Recent labs:31620::\"Labs in chart were reviewed.\"}          {Prenatal Labs:7499745174}        "

## 2025-01-13 NOTE — TELEPHONE ENCOUNTER
Patient called with complaint of constant vaginal discharge since last night. Was diagnosed with BV in triage last night, but discharge has increased overnight. Has been having constant leakage of fluid overnight, had to put a towel down overnight. Instructed patient to return to triage for reassessment.

## 2025-01-14 LAB — BACTERIA UR CULT: NORMAL

## 2025-01-15 ENCOUNTER — APPOINTMENT (OUTPATIENT)
Dept: OBSTETRICS AND GYNECOLOGY | Facility: CLINIC | Age: 20
End: 2025-01-15
Payer: COMMERCIAL

## 2025-01-16 ENCOUNTER — TELEPHONE (OUTPATIENT)
Facility: CLINIC | Age: 20
End: 2025-01-16
Payer: COMMERCIAL

## 2025-01-16 NOTE — TELEPHONE ENCOUNTER
"Patient calling about NICU questions  Patient was going to breastfeed but the NICU has been giving the baby donor milk and now wants to change the baby to formula. Patient wants a second opinion. Should the baby be moved to another location. Patient doesn't understand why this is happening. Patient was \"ignored and denied the right to breastfeed yesterday\" Baby was delivered at [facility]. Patient would also like to know when to schedule her follow up appointment with Dr. Hudson  "

## 2025-01-21 ENCOUNTER — APPOINTMENT (OUTPATIENT)
Facility: CLINIC | Age: 20
End: 2025-01-21
Payer: COMMERCIAL

## 2025-01-29 ENCOUNTER — APPOINTMENT (OUTPATIENT)
Dept: OBSTETRICS AND GYNECOLOGY | Facility: CLINIC | Age: 20
End: 2025-01-29
Payer: COMMERCIAL

## 2025-01-29 VITALS
WEIGHT: 154 LBS | HEIGHT: 66 IN | DIASTOLIC BLOOD PRESSURE: 80 MMHG | SYSTOLIC BLOOD PRESSURE: 112 MMHG | BODY MASS INDEX: 24.75 KG/M2

## 2025-01-29 PROBLEM — Z34.80 SUPERVISION OF OTHER NORMAL PREGNANCY, ANTEPARTUM (HHS-HCC): Status: RESOLVED | Noted: 2024-08-21 | Resolved: 2025-01-29

## 2025-01-29 PROBLEM — Z3A.32 32 WEEKS GESTATION OF PREGNANCY (HHS-HCC): Status: RESOLVED | Noted: 2024-08-21 | Resolved: 2025-01-29

## 2025-01-29 PROBLEM — O98.519 COVID-19 AFFECTING PREGNANCY, ANTEPARTUM (HHS-HCC): Status: RESOLVED | Noted: 2024-09-18 | Resolved: 2025-01-29

## 2025-01-29 PROBLEM — U07.1 COVID-19 AFFECTING PREGNANCY, ANTEPARTUM (HHS-HCC): Status: RESOLVED | Noted: 2024-09-18 | Resolved: 2025-01-29

## 2025-01-29 PROBLEM — R76.8 HEPATITIS B ANTIBODY POSITIVE: Status: RESOLVED | Noted: 2024-08-22 | Resolved: 2025-01-29

## 2025-01-29 ASSESSMENT — EDINBURGH POSTNATAL DEPRESSION SCALE (EPDS)
I HAVE LOOKED FORWARD WITH ENJOYMENT TO THINGS: AS MUCH AS I EVER DID
THINGS HAVE BEEN GETTING ON TOP OF ME: NO, I HAVE BEEN COPING AS WELL AS EVER
I HAVE BEEN SO UNHAPPY THAT I HAVE HAD DIFFICULTY SLEEPING: NOT AT ALL
I HAVE BEEN SO UNHAPPY THAT I HAVE BEEN CRYING: NO, NEVER
TOTAL SCORE: 0
I HAVE BEEN ANXIOUS OR WORRIED FOR NO GOOD REASON: NO, NOT AT ALL
I HAVE FELT SAD OR MISERABLE: NO, NOT AT ALL
I HAVE BEEN ABLE TO LAUGH AND SEE THE FUNNY SIDE OF THINGS: AS MUCH AS I ALWAYS COULD
I HAVE FELT SCARED OR PANICKY FOR NO GOOD REASON: NO, NOT AT ALL
THE THOUGHT OF HARMING MYSELF HAS OCCURRED TO ME: NEVER
I HAVE BLAMED MYSELF UNNECESSARILY WHEN THINGS WENT WRONG: NO, NEVER

## 2025-01-29 ASSESSMENT — PATIENT HEALTH QUESTIONNAIRE - PHQ9
SUM OF ALL RESPONSES TO PHQ9 QUESTIONS 1 AND 2: 0
1. LITTLE INTEREST OR PLEASURE IN DOING THINGS: NOT AT ALL
2. FEELING DOWN, DEPRESSED OR HOPELESS: NOT AT ALL

## 2025-01-29 ASSESSMENT — ENCOUNTER SYMPTOMS
DEPRESSION: 0
OCCASIONAL FEELINGS OF UNSTEADINESS: 0
LOSS OF SENSATION IN FEET: 0

## 2025-01-29 ASSESSMENT — COLUMBIA-SUICIDE SEVERITY RATING SCALE - C-SSRS
2. HAVE YOU ACTUALLY HAD ANY THOUGHTS OF KILLING YOURSELF?: NO
1. IN THE PAST MONTH, HAVE YOU WISHED YOU WERE DEAD OR WISHED YOU COULD GO TO SLEEP AND NOT WAKE UP?: NO
6. HAVE YOU EVER DONE ANYTHING, STARTED TO DO ANYTHING, OR PREPARED TO DO ANYTHING TO END YOUR LIFE?: NO

## 2025-01-29 NOTE — PROGRESS NOTES
Luda Navas is a 19 y.o. female who presents with a chief complaint of Postpartum Care (2 weeks)      SUBJECTIVE  Patient presents 2 weeks postpartum vaginal delivery.  She is doing well denies any problems.  She has a Nexplanon birth control.  She is breast-feeding.  She denies any depression, suicidal homicidal ideations or plan.  She is happy with her delivery    Past Medical History:   Diagnosis Date    Gestational hypertension ([facility])     Placenta previa      Past Surgical History:   Procedure Laterality Date    OTHER SURGICAL HISTORY  10/04/2022    Tonsillectomy with adenoidectomy     Social History     Socioeconomic History    Marital status:    Tobacco Use    Smoking status: Some Days     Current packs/day: 0.25     Average packs/day: 0.3 packs/day for 3.0 years (0.8 ttl pk-yrs)     Types: Cigarettes    Smokeless tobacco: Never   Vaping Use    Vaping status: Some Days   Substance and Sexual Activity    Alcohol use: Never    Drug use: Never    Sexual activity: Yes     Partners: Male     Birth control/protection: None     Family History   Problem Relation Name Age of Onset    Depression Mother Hanna brown     Drug abuse Mother Hanna brown     Drug abuse Father Jad bowman     Learning disabilities Father Jad bowman     Vision loss Father Jad bowman     Anesthesia related problems Maternal Grandmother Natacha gonzalez     Cancer Maternal Grandmother Natacha gonzalez     Hypertension Maternal Grandmother Natacha gonzalez     Arthritis Paternal Grandfather Terrance     Cancer Paternal Grandfather Terrance     Learning disabilities Paternal Grandfather Terrance     Breast cancer Paternal Grandmother Alona     Learning disabilities Paternal Grandmother Alona     Asthma Sister Bharathiiamerly kitchen     Depression Sister Tamica     Depression Father's Brother Khai     Drug abuse Father's Brother Khai        OB History    Para Term  AB Living   2 1 1 0 0 1   SAB IAB Ectopic Multiple Live Births  "  0 0 0 0 1      # Outcome Date GA Lbr Davon/2nd Weight Sex Type Anes PTL Lv   2             1 Term 22 38w0d  2.92 kg M Vag-Spont EPI N SRAVAN       OBJECTIVE  No Known Allergies   (Not in a hospital admission)       Review of Systems  History obtained from the patient  General ROS: negative  Psychological ROS: negative  Gastrointestinal ROS: no abdominal pain, change in bowel habits, or black or bloody stools  Musculoskeletal ROS: negative  Physical Exam  General Appearance: awake, alert, oriented, in no acute distress, well developed, well nourished, and in no acute distress  Skin: there are no suspicious lesions or rashes of concern, skin color, texture, turgor are normal; there are no bruises, rashes or lesions.  Head/Face: NCAT  Eyes: No gross abnormalities., PERRL, and EOMI  Neck: neck- supple, no mass, non-tender  Back: no pain to palpation  Abdomen: Soft, non-tender, normal bowel sounds; no bruits, organomegaly or masses.  Extremities: Extremities warm to touch, pink, with no edema.  Musculoskeletal: negative    /80   Ht 1.676 m (5' 6\")   Wt 69.9 kg (154 lb)   Breastfeeding No   BMI 24.86 kg/m²    Problem List Items Addressed This Visit    None  Visit Diagnoses       Encounter for postpartum visit    -  Primary         Follow up      "

## 2025-02-12 ENCOUNTER — APPOINTMENT (OUTPATIENT)
Dept: OBSTETRICS AND GYNECOLOGY | Facility: CLINIC | Age: 20
End: 2025-02-12
Payer: COMMERCIAL

## 2025-02-12 VITALS — DIASTOLIC BLOOD PRESSURE: 72 MMHG | WEIGHT: 150 LBS | SYSTOLIC BLOOD PRESSURE: 118 MMHG | BODY MASS INDEX: 24.21 KG/M2

## 2025-02-12 NOTE — PROGRESS NOTES
Luda Navas is a 19 y.o. female who presents with a chief complaint of Postpartum Follow-up      SUBJECTIVE  4 weeks pp  Nexplanon.   No depression suicidal or homocidal ideation or plan    Past Medical History:   Diagnosis Date    Gestational hypertension ([facility])     Placenta previa      Past Surgical History:   Procedure Laterality Date    OTHER SURGICAL HISTORY  10/04/2022    Tonsillectomy with adenoidectomy     Social History     Socioeconomic History    Marital status:    Tobacco Use    Smoking status: Some Days     Current packs/day: 0.25     Average packs/day: 0.3 packs/day for 3.0 years (0.8 ttl pk-yrs)     Types: Cigarettes    Smokeless tobacco: Never   Vaping Use    Vaping status: Some Days   Substance and Sexual Activity    Alcohol use: Never    Drug use: Never    Sexual activity: Yes     Partners: Male     Birth control/protection: None     Family History   Problem Relation Name Age of Onset    Depression Mother Hanna brown     Drug abuse Mother Hanna brown     Drug abuse Father Jad bowman     Learning disabilities Father Jad bowman     Vision loss Father Jad bowman     Anesthesia related problems Maternal Grandmother Natacha gonzalez     Cancer Maternal Grandmother Natacha gonzalez     Hypertension Maternal Grandmother Natacha gonzalez     Arthritis Paternal Grandfather Terrance     Cancer Paternal Grandfather Terrance     Learning disabilities Paternal Grandfather Terrance     Breast cancer Paternal Grandmother Alona     Learning disabilities Paternal Grandmother Alona     Asthma Sister Alyx kitchen     Depression Sister Tamica     Depression Father's Brother Khai     Drug abuse Father's Brother Khai        OB History    Para Term  AB Living   2 1 1 0 0 1   SAB IAB Ectopic Multiple Live Births   0 0 0 0 1      # Outcome Date GA Lbr Davon/2nd Weight Sex Type Anes PTL Lv   2             1 Term 22 38w0d  2.92 kg M Vag-Spont EPI N SRAVAN       OBJECTIVE  No Known  Allergies   (Not in a hospital admission)       Review of Systems  History obtained from the patient  General ROS: negative  Psychological ROS: negative  Gastrointestinal ROS: no abdominal pain, change in bowel habits, or black or bloody stools  Musculoskeletal ROS: negative  Physical Exam  General Appearance: awake, alert, oriented, in no acute distress, well developed, well nourished, and in no acute distress  Skin: there are no suspicious lesions or rashes of concern, skin color, texture, turgor are normal; there are no bruises, rashes or lesions.  Head/Face: NCAT  Eyes: No gross abnormalities., PERRL, and EOMI  Neck: neck- supple, no mass, non-tender  Back: no pain to palpation  Abdomen: Soft, non-tender, normal bowel sounds; no bruits, organomegaly or masses.  Extremities: Extremities warm to touch, pink, with no edema.  Musculoskeletal: negative    /72 (BP Location: Right arm)   Wt 68 kg (150 lb)   BMI 24.21 kg/m²    Problem List Items Addressed This Visit    None  Visit Diagnoses       Encounter for postpartum visit    -  Primary

## 2025-04-16 ENCOUNTER — APPOINTMENT (OUTPATIENT)
Dept: OBSTETRICS AND GYNECOLOGY | Facility: CLINIC | Age: 20
End: 2025-04-16
Payer: COMMERCIAL